# Patient Record
Sex: MALE | Race: WHITE | NOT HISPANIC OR LATINO | Employment: OTHER | URBAN - NONMETROPOLITAN AREA
[De-identification: names, ages, dates, MRNs, and addresses within clinical notes are randomized per-mention and may not be internally consistent; named-entity substitution may affect disease eponyms.]

---

## 2022-08-02 ENCOUNTER — TRANSFERRED RECORDS (OUTPATIENT)
Dept: HEALTH INFORMATION MANAGEMENT | Facility: OTHER | Age: 72
End: 2022-08-02

## 2023-04-14 ENCOUNTER — TELEPHONE (OUTPATIENT)
Dept: SURGERY | Facility: OTHER | Age: 73
End: 2023-04-14

## 2023-04-14 NOTE — TELEPHONE ENCOUNTER
[facility] Referral    [facility] has [facility] for [facility] with a diagnosis of GERD without esophagitis.    Please advise.    Thank you,  Ivania Méndez on 4/14/2023 at 9:42 AM

## 2023-04-18 NOTE — TELEPHONE ENCOUNTER
History needs to be reviewed before scheduling endoscopy-referral should be scanned it for us to review. Need to know medications, medical problems, surgical history, etc before considering a surgical procedure. Thanks! Alberta Abdullahi MD on 4/18/2023 at 11:51 AM

## 2023-04-18 NOTE — TELEPHONE ENCOUNTER
Ok to schedule next routine available diagnostic EGD Thanks! Alberta Abdullahi MD on 4/18/2023 at 2:35 PM

## 2023-04-19 ENCOUNTER — TELEPHONE (OUTPATIENT)
Dept: SURGERY | Facility: OTHER | Age: 73
End: 2023-04-19

## 2023-04-19 NOTE — TELEPHONE ENCOUNTER
Screening Questions for the Scheduling of Screening Colonoscopies   (If Colonoscopy is diagnostic, Provider should review the chart before scheduling.)  Are you younger than 50 or older than 80?  NO  Do you take aspirin or fish oil?  ASPIRIN (if yes, tell patient to stop 1 week prior to Colonoscopy)  Do you take warfarin (Coumadin), clopidogrel (Plavix), apixaban (Eliquis), dabigatram (Pradaxa), rivaroxaban (Xarelto) or any blood thinner? NO  Do you use oxygen at home?  CPAP  Do you have kidney disease? NO  Are you on dialysis? NO  Have you had a stroke or heart attack in the last year? NO  Have you had a stent in your heart or any blood vessel in the last year? NO  Have you had a transplant of any organ? NO  Have you had a colonoscopy or upper endoscopy (EGD) before? NO  Date of scheduled EGD. 07/05/2023  Provider [facility]  Pharmacy [facility]

## 2023-06-28 RX ORDER — LISINOPRIL AND HYDROCHLOROTHIAZIDE 12.5; 2 MG/1; MG/1
2 TABLET ORAL DAILY
COMMUNITY

## 2023-06-28 RX ORDER — ASPIRIN 81 MG/1
81 TABLET ORAL DAILY
COMMUNITY

## 2023-06-28 RX ORDER — ATORVASTATIN CALCIUM 80 MG/1
80 TABLET, FILM COATED ORAL DAILY
COMMUNITY

## 2023-06-28 RX ORDER — AMLODIPINE BESYLATE 10 MG/1
10 TABLET ORAL DAILY
COMMUNITY

## 2023-07-05 ENCOUNTER — ANESTHESIA (OUTPATIENT)
Dept: SURGERY | Facility: OTHER | Age: 73
End: 2023-07-05
Payer: COMMERCIAL

## 2023-07-05 ENCOUNTER — ANESTHESIA EVENT (OUTPATIENT)
Dept: SURGERY | Facility: OTHER | Age: 73
End: 2023-07-05
Payer: COMMERCIAL

## 2023-07-05 ENCOUNTER — HOSPITAL ENCOUNTER (OUTPATIENT)
Facility: OTHER | Age: 73
Discharge: HOME | End: 2023-07-05
Attending: SURGERY | Admitting: SURGERY
Payer: COMMERCIAL

## 2023-07-05 VITALS
TEMPERATURE: 97.5 F | HEART RATE: 52 BPM | OXYGEN SATURATION: 93 % | BODY MASS INDEX: 28.2 KG/M2 | WEIGHT: 197 LBS | SYSTOLIC BLOOD PRESSURE: 146 MMHG | HEIGHT: 70 IN | RESPIRATION RATE: 14 BRPM | DIASTOLIC BLOOD PRESSURE: 76 MMHG

## 2023-07-05 DIAGNOSIS — K22.70 BARRETT'S ESOPHAGUS DETERMINED BY ENDOSCOPY: Primary | ICD-10-CM

## 2023-07-05 LAB — GLUCOSE BLDC GLUCOMTR-MCNC: 127 MG/DL (ref 70–99)

## 2023-07-05 PROCEDURE — 250N000009 HC RX 250: Performed by: NURSE ANESTHETIST, CERTIFIED REGISTERED

## 2023-07-05 PROCEDURE — 43239 EGD BIOPSY SINGLE/MULTIPLE: CPT | Performed by: NURSE ANESTHETIST, CERTIFIED REGISTERED

## 2023-07-05 PROCEDURE — 82962 GLUCOSE BLOOD TEST: CPT

## 2023-07-05 PROCEDURE — 99100 ANES PT EXTEME AGE<1 YR&>70: CPT | Performed by: NURSE ANESTHETIST, CERTIFIED REGISTERED

## 2023-07-05 PROCEDURE — 43239 EGD BIOPSY SINGLE/MULTIPLE: CPT | Performed by: SURGERY

## 2023-07-05 PROCEDURE — 88305 TISSUE EXAM BY PATHOLOGIST: CPT

## 2023-07-05 PROCEDURE — 250N000011 HC RX IP 250 OP 636: Performed by: NURSE ANESTHETIST, CERTIFIED REGISTERED

## 2023-07-05 PROCEDURE — 258N000003 HC RX IP 258 OP 636: Performed by: SURGERY

## 2023-07-05 PROCEDURE — 999N000010 HC STATISTIC ANES STAT CODE-CRNA PER MINUTE: Performed by: SURGERY

## 2023-07-05 RX ORDER — NALOXONE HYDROCHLORIDE 0.4 MG/ML
0.2 INJECTION, SOLUTION INTRAMUSCULAR; INTRAVENOUS; SUBCUTANEOUS
Status: DISCONTINUED | OUTPATIENT
Start: 2023-07-05 | End: 2023-07-05 | Stop reason: HOSPADM

## 2023-07-05 RX ORDER — ONDANSETRON 4 MG/1
4 TABLET, ORALLY DISINTEGRATING ORAL EVERY 6 HOURS PRN
Status: DISCONTINUED | OUTPATIENT
Start: 2023-07-05 | End: 2023-07-05 | Stop reason: HOSPADM

## 2023-07-05 RX ORDER — FLUMAZENIL 0.1 MG/ML
0.2 INJECTION, SOLUTION INTRAVENOUS
Status: DISCONTINUED | OUTPATIENT
Start: 2023-07-05 | End: 2023-07-05 | Stop reason: HOSPADM

## 2023-07-05 RX ORDER — ONDANSETRON 2 MG/ML
4 INJECTION INTRAMUSCULAR; INTRAVENOUS EVERY 6 HOURS PRN
Status: DISCONTINUED | OUTPATIENT
Start: 2023-07-05 | End: 2023-07-05 | Stop reason: HOSPADM

## 2023-07-05 RX ORDER — PROPOFOL 10 MG/ML
INJECTION, EMULSION INTRAVENOUS CONTINUOUS PRN
Status: DISCONTINUED | OUTPATIENT
Start: 2023-07-05 | End: 2023-07-05

## 2023-07-05 RX ORDER — NALOXONE HYDROCHLORIDE 0.4 MG/ML
0.4 INJECTION, SOLUTION INTRAMUSCULAR; INTRAVENOUS; SUBCUTANEOUS
Status: DISCONTINUED | OUTPATIENT
Start: 2023-07-05 | End: 2023-07-05 | Stop reason: HOSPADM

## 2023-07-05 RX ORDER — PROPOFOL 10 MG/ML
INJECTION, EMULSION INTRAVENOUS PRN
Status: DISCONTINUED | OUTPATIENT
Start: 2023-07-05 | End: 2023-07-05

## 2023-07-05 RX ORDER — FAMOTIDINE 20 MG/1
20 TABLET, FILM COATED ORAL 2 TIMES DAILY
Qty: 60 TABLET | Refills: 3 | Status: SHIPPED | OUTPATIENT
Start: 2023-07-05

## 2023-07-05 RX ORDER — ONDANSETRON 2 MG/ML
4 INJECTION INTRAMUSCULAR; INTRAVENOUS
Status: DISCONTINUED | OUTPATIENT
Start: 2023-07-05 | End: 2023-07-05 | Stop reason: HOSPADM

## 2023-07-05 RX ORDER — PROCHLORPERAZINE MALEATE 5 MG
5 TABLET ORAL EVERY 6 HOURS PRN
Status: DISCONTINUED | OUTPATIENT
Start: 2023-07-05 | End: 2023-07-05 | Stop reason: HOSPADM

## 2023-07-05 RX ORDER — LIDOCAINE 40 MG/G
CREAM TOPICAL
Status: DISCONTINUED | OUTPATIENT
Start: 2023-07-05 | End: 2023-07-05 | Stop reason: HOSPADM

## 2023-07-05 RX ORDER — SODIUM CHLORIDE, SODIUM LACTATE, POTASSIUM CHLORIDE, CALCIUM CHLORIDE 600; 310; 30; 20 MG/100ML; MG/100ML; MG/100ML; MG/100ML
INJECTION, SOLUTION INTRAVENOUS CONTINUOUS
Status: DISCONTINUED | OUTPATIENT
Start: 2023-07-05 | End: 2023-07-05 | Stop reason: HOSPADM

## 2023-07-05 RX ORDER — LIDOCAINE HYDROCHLORIDE 20 MG/ML
INJECTION, SOLUTION INFILTRATION; PERINEURAL PRN
Status: DISCONTINUED | OUTPATIENT
Start: 2023-07-05 | End: 2023-07-05

## 2023-07-05 RX ADMIN — PROPOFOL 40 MG: 10 INJECTION, EMULSION INTRAVENOUS at 08:41

## 2023-07-05 RX ADMIN — PROPOFOL 140 MCG/KG/MIN: 10 INJECTION, EMULSION INTRAVENOUS at 08:41

## 2023-07-05 RX ADMIN — SODIUM CHLORIDE, POTASSIUM CHLORIDE, SODIUM LACTATE AND CALCIUM CHLORIDE: 600; 310; 30; 20 INJECTION, SOLUTION INTRAVENOUS at 08:10

## 2023-07-05 RX ADMIN — LIDOCAINE HYDROCHLORIDE 40 MG: 20 INJECTION, SOLUTION INFILTRATION; PERINEURAL at 08:41

## 2023-07-05 ASSESSMENT — ACTIVITIES OF DAILY LIVING (ADL)
ADLS_ACUITY_SCORE: 35
ADLS_ACUITY_SCORE: 35

## 2023-07-05 NOTE — H&P
"CHIEF COMPLAINT / REASON FOR PROCEDURE:  Reflux, epigastric pain    PERTINENT HISTORY   Patient complains of reflux and about a month of epigastric pain. Previous EGD none.     Past Medical History:   Diagnosis Date     Aortic valve sclerosis      Bilateral sensorineural hearing loss      Diabetes mellitus without complication (H)      Essential hypertension      KE (obstructive sleep apnea)      Presbyopia      Past Surgical History:   Procedure Laterality Date     RELEASE DUPUYTRENS CONTRACTURE BILATERAL       Other:  None  Bleeding tendencies:  no  Relevant Family History:  none    Relevant Social History:  none    A relevant review of systems was performed and wasNegative. See HPI.    ALLERGIES/SENSITIVITIES: No Known Allergies     CURRENT MEDICATIONS:    Current Facility-Administered Medications   Medication     lactated ringers infusion     lidocaine (LMX4) cream     lidocaine 1 % 0.1-1 mL     ondansetron (ZOFRAN) injection 4 mg     sodium chloride (PF) 0.9% PF flush 3 mL     sodium chloride (PF) 0.9% PF flush 3 mL     No current facility-administered medications on file prior to encounter.  amLODIPine (NORVASC) 10 MG tablet, Take 10 mg by mouth daily  aspirin 81 MG EC tablet, Take 81 mg by mouth daily  atorvastatin (LIPITOR) 80 MG tablet, Take 80 mg by mouth daily  lisinopril-hydrochlorothiazide (ZESTORETIC) 20-12.5 MG tablet, Take 2 tablets by mouth daily  metFORMIN (GLUCOPHAGE) 500 MG tablet, Take 1,000 mg by mouth 2 times daily (with meals)          PRE-SEDATION ASSESSMENT:   /61   Pulse 55   Temp 97.3  F (36.3  C) (Temporal)   Resp 14   Ht 1.778 m (5' 10\")   Wt 89.4 kg (197 lb)   BMI 28.27 kg/m    Lung Exam:Normal  Heart Exam:  Normal    Comment(s):      IMPRESSION:  Epigastric pain.    PLAN:  I discussed diagnostic EGD with the patient. Anesthesia requested due to: age more than 68, history of KE.      "

## 2023-07-05 NOTE — OP NOTE
PROCEDURE NOTE    SURGEON: Alberta Abdullahi MD.    PRE-OP DIAGNOSIS:   Epigastric pain      POST-OP DIAGNOSIS: Rubalcava's changes, gastritis    PROCEDURE PLANNED:   Diagnostic EGD, flexible        PROCEDURE PERFORMED:  EGD with biopsy, flexible    SPECIMEN:  Duodenum, antrum, GEJ biopsies    ANESTHESIA: See anesthesia record, Coverage requested due to: KE and age more than 68    ESTIMATED BLOOD LOSS: None    COMPLICATIONS:  None    INDICATION FORTHE PROCEDURE: The patient is a 72 year oldmale. The patient presents with epigastric pain. I explained to the patient the risks, benefits and alternatives to diagnostic EGD for evaluating his complaints. We specifically discussed the risks of bleeding, infection, perforation and the risks of sedation. The patient's questions were answered and the patient wished to proceed. Informed consent paperwork was completed.    PROCEDURE: The patient was taken to the endoscopy suite. Appropriate monitors were attached. The patient was placed in the left lateral decubitus position. Bite block was positioned.Timeout was performed confirming the patient's identity and procedure to be performed. After appropriate sedation was confirmed, the flexible endoscope was advanced into the oropharynx. The posterior oropharynx appeared grossly normal. The scope was advanced into the proximal esophagus. The esophagus was insufflated with air. The scope was advanced under direct visualization. No acute abnormalities of the esophagus were noted. The scope was advanced into the stomach. Gastritis was noted. The scope was advanced through the pylorus into the duodenal bulb. The bulb and distal duodenum appeared mildly inflamed. Biopsies were obtained. The scope was withdrawn back into the stomach. Antral biopsy was obtained and sent to pathology. The scope was retroflexed and the GE junction inspected. No abnormalities were noted. The scope was returned to a neutral position and the stomach was  decompressed. The scope was withdrawn to the GE junction and biopsy obtained. There was a long segment of Rubalcava's changes. The mucosa of the esophagus was inspected while withdrawing the scope. No abnormalities were noted. The scope was withdrawn from the patient. The bite block was removed. The patient tolerated the procedure with no immediately apparent complication. The patient was taken to recovery in stable condition.    FOLLOW UP:  RECOMMENDATIONS Will call with pathology results. Start pepcid.

## 2023-07-05 NOTE — ANESTHESIA POSTPROCEDURE EVALUATION
Patient: Tahir James    Procedure: Procedure(s):  Esophagoscopy, gastroscopy, duodenoscopy (EGD), combined       Anesthesia Type:  MAC    Note:  Disposition: Outpatient   Postop Pain Control: Uneventful            Sign Out: Well controlled pain   PONV: No   Neuro/Psych: Uneventful            Sign Out: Acceptable/Baseline neuro status   Airway/Respiratory: Uneventful            Sign Out: Acceptable/Baseline resp. status   CV/Hemodynamics: Uneventful            Sign Out: Acceptable CV status; No obvious hypovolemia; No obvious fluid overload   Other NRE: NONE   DID A NON-ROUTINE EVENT OCCUR?            Last vitals:  Vitals Value Taken Time   /51 07/05/23 0900   Temp 97.6  F (36.4  C) 07/05/23 0855   Pulse 52 07/05/23 0900   Resp     SpO2 93 % 07/05/23 0909   Vitals shown include unvalidated device data.    Electronically Signed By: JAKI COLALDO CRNA  July 5, 2023  9:10 AM

## 2023-07-05 NOTE — DISCHARGE INSTRUCTIONS
[facility]  Adult Discharge Orders & Instructions    ________________________________________________________________          For 12 hours after surgery  Get plenty of rest.  A responsible adult must stay with you for at least 12 hours after you leave the hospital.   You may feel lightheaded.  IF so, sit for a few minutes before standing.  Have someone help you get up.   You may have a slight fever. Call the doctor if your fever is over 101 F (38.3 C) (taken under the tongue) or lasts longer than 24 hours.  You may have a dry mouth, a sore throat, muscle aches or trouble sleeping.  These should go away after 24 hours.  Do not make important or legal decisions.  6.   Do not drive or use heavy equipment.  If you have weakness or tingling, don't drive or use heavy equipment until this feeling goes away.    To contact a doctor, call   [phone]_______________________   Procedure you had done: EGD with biopsies  Your health care provider is:  No Ref-Primary, Physician  Your surgeon is Dr. Alberta Abdullahi.   Please call your health care provider or surgeon at [phone] if:    - you feel you are getting worse or having an increase in problems    - fever greater than 101 degrees  - increasing shortness of breath or chest pain  - any signs of infection (increasing redness, swelling, tenderness, warmth, change in appearance, or  increased drainage)  - blood in your urine or stool  - coughing or vomiting blood  - nausea (upset stomach) and vomiting and/or diarrhea that will not stop  - severe pain that is not relieved by medicine, rest or ice  You have had medications for sedation. Please be aware that this can cause drowsiness and impaired judgment for up to 24 hours after your procedure. Do not drive, operate power tools or drink alcohol for 24 hours.  If samples were taken-you will get a phone call and a letter with your results in the next 7-10 days. If you don't get results, please call and let us know!

## 2023-07-05 NOTE — ANESTHESIA PREPROCEDURE EVALUATION
Anesthesia Pre-Procedure Evaluation    Patient: Tahir James   MRN: [id number] : 1950        Procedure : Procedure(s):  Esophagoscopy, gastroscopy, duodenoscopy (EGD), combined          Past Medical History:   Diagnosis Date     Aortic valve sclerosis      Bilateral sensorineural hearing loss      Diabetes mellitus without complication (H)      Essential hypertension      KE (obstructive sleep apnea)      Presbyopia       Past Surgical History:   Procedure Laterality Date     RELEASE DUPUYTRENS CONTRACTURE BILATERAL        No Known Allergies   Social History     Tobacco Use     Smoking status: Not on file     Smokeless tobacco: Not on file   Substance Use Topics     Alcohol use: Not on file      Wt Readings from Last 1 Encounters:   23 89.4 kg (197 lb)        Anesthesia Evaluation   Pt has had prior anesthetic. Type: MAC.    No history of anesthetic complications       ROS/MED HX  ENT/Pulmonary:     (+) sleep apnea, mild, uses CPAP,     Neurologic:  - neg neurologic ROS     Cardiovascular:     (+) hypertension-----    METS/Exercise Tolerance: >4 METS    Hematologic:  - neg hematologic  ROS     Musculoskeletal:  - neg musculoskeletal ROS     GI/Hepatic:  - neg GI/hepatic ROS     Renal/Genitourinary:  - neg Renal ROS     Endo:     (+) type II DM, Not using insulin, - not using insulin pump. not previously admitted for DM/DKA.     Psychiatric/Substance Use:  - neg psychiatric ROS     Infectious Disease:  - neg infectious disease ROS     Malignancy:  - neg malignancy ROS     Other:  - neg other ROS   (-) Any chance pregnant       Physical Exam    Airway        Mallampati: II   TM distance: > 3 FB   Neck ROM: full   Mouth opening: > 3 cm    Respiratory Devices and Support         Dental     Comment: Top and bottom denture plate.     (+) Edentulous and Removable bridges or other hardware      Cardiovascular   cardiovascular exam normal       Rhythm and rate: regular and normal     Pulmonary    pulmonary exam normal        breath sounds clear to auscultation           OUTSIDE LABS:  CBC: No results found for: WBC, HGB, HCT, PLT  BMP:   Lab Results   Component Value Date     (H) 07/05/2023     COAGS: No results found for: PTT, INR, FIBR  POC: No results found for: BGM, HCG, HCGS  HEPATIC: No results found for: ALBUMIN, PROTTOTAL, ALT, AST, GGT, ALKPHOS, BILITOTAL, BILIDIRECT, XENIA  OTHER: No results found for: PH, LACT, A1C, WILLIAMS, PHOS, MAG, LIPASE, AMYLASE, TSH, T4, T3, CRP, SED    Anesthesia Plan    ASA Status:  2   NPO Status:  NPO Appropriate    Anesthesia Type: MAC.     - Reason for MAC: straight local not clinically adequate   Induction: Propofol.   Maintenance: TIVA.        Consents    Anesthesia Plan(s) and associated risks, benefits, and realistic alternatives discussed. Questions answered and patient/representative(s) expressed understanding.    - Discussed:     - Discussed with:  Patient      - Extended Intubation/Ventilatory Support Discussed: No.      - Patient is DNR/DNI Status: No    Use of blood products discussed: No .     Postoperative Care            Comments:                JAKI Collado CRNA

## 2023-07-05 NOTE — OR NURSING
Patient has been discharged to home at 1015 via ambulation to car accompanied by Ursula.    Written discharge instructions were provided to patient.  Prescriptions were escribed.  Patient states their pain is 0/10, and denies any nausea or dizziness upon discharge.    Patient and adult caring for them verbalize understanding of discharge instructions including no driving until tomorrow and no longer taking narcotic pain medications - no operating mechanical equipment and no making any important decisions.They understand reason for discharge, and necessary follow-up appointments.

## 2023-07-05 NOTE — ANESTHESIA CARE TRANSFER NOTE
Patient: Tahir James    Procedure: Procedure(s):  Esophagoscopy, gastroscopy, duodenoscopy (EGD), combined       Diagnosis: GERD (gastroesophageal reflux disease) [K21.9]  Diagnosis Additional Information: No value filed.    Anesthesia Type:   MAC     Note:    Oropharynx: oropharynx clear of all foreign objects  Level of Consciousness: awake  Oxygen Supplementation: room air        Vital Signs Stable: post-procedure vital signs reviewed and stable  Report to RN Given: handoff report given  Patient transferred to: Phase II    Handoff Report: Identifed the Patient, Identified the Reponsible Provider, Reviewed the pertinent medical history, Discussed the surgical course, Reviewed Intra-OP anesthesia mangement and issues during anesthesia, Set expectations for post-procedure period and Allowed opportunity for questions and acknowledgement of understanding      Vitals:  Vitals Value Taken Time   BP     Temp     Pulse     Resp     SpO2         Electronically Signed By: JAKI COLLADO [facility]  July 5, 2023  8:55 AM

## 2023-07-07 LAB
PATH REPORT.COMMENTS IMP SPEC: NORMAL
PATH REPORT.FINAL DX SPEC: NORMAL
PATH REPORT.RELEVANT HX SPEC: NORMAL
PHOTO IMAGE: NORMAL

## 2023-07-11 DIAGNOSIS — A04.8 H. PYLORI INFECTION: Primary | ICD-10-CM

## 2023-07-11 RX ORDER — AMOXICILLIN 500 MG/1
1000 CAPSULE ORAL 2 TIMES DAILY
Qty: 56 CAPSULE | Refills: 0 | Status: SHIPPED | OUTPATIENT
Start: 2023-07-11 | End: 2023-07-25

## 2023-07-11 RX ORDER — CLARITHROMYCIN 500 MG
500 TABLET ORAL 2 TIMES DAILY
Qty: 28 TABLET | Refills: 0 | Status: SHIPPED | OUTPATIENT
Start: 2023-07-11 | End: 2023-07-25

## 2023-07-14 ENCOUNTER — TELEPHONE (OUTPATIENT)
Dept: SURGERY | Facility: OTHER | Age: 73
End: 2023-07-14

## 2023-07-14 NOTE — TELEPHONE ENCOUNTER
[facility] with the [facility] called and requested a call back regarding last office visit notes. Saima stated the notes are needed to order famotidine.    Fax ([facility]): [phone]  Fax: (alternate; [facility]): [phone]    Okay to leave detailed message.    Maame Leong on 7/14/2023 at 3:55 PM

## 2023-07-20 ENCOUNTER — TELEPHONE (OUTPATIENT)
Dept: SURGERY | Facility: OTHER | Age: 73
End: 2023-07-20

## 2023-07-20 NOTE — TELEPHONE ENCOUNTER
Called and spoke to Patient after verifying last name and date of birth. Patient was calling to verify if they need to take the amoxicillin, omeprazole and the clarithromycin for 14 days, per the order for the medications from Dr. Abdullahi, take for 14 days. Patient was wondering if famotidine is something they would have to take long term, discussed if medication is working to [facility] to have discuss refilling it. Patient verbalized understanding and would call back if any other questions come up.       Tonya Ahumada RN on 7/20/2023 at 2:34 PM

## 2023-08-01 NOTE — TELEPHONE ENCOUNTER
I have not received a return call from the [facility] or the patient.  Will open new note if they call back.  Ronda Phan LPN .......8/1/2023 4:56 PM

## 2023-08-07 ENCOUNTER — MEDICAL CORRESPONDENCE (OUTPATIENT)
Dept: HEALTH INFORMATION MANAGEMENT | Facility: OTHER | Age: 73
End: 2023-08-07

## 2023-08-30 DIAGNOSIS — Z12.11 SPECIAL SCREENING FOR MALIGNANT NEOPLASMS, COLON: Primary | ICD-10-CM

## 2023-08-30 RX ORDER — BISACODYL 5 MG
TABLET, DELAYED RELEASE (ENTERIC COATED) ORAL
Qty: 2 TABLET | Refills: 0 | Status: ON HOLD | OUTPATIENT
Start: 2023-08-30 | End: 2023-12-01

## 2023-08-30 RX ORDER — POLYETHYLENE GLYCOL 3350, SODIUM CHLORIDE, SODIUM BICARBONATE, POTASSIUM CHLORIDE 420; 11.2; 5.72; 1.48 G/4L; G/4L; G/4L; G/4L
4000 POWDER, FOR SOLUTION ORAL ONCE
Qty: 4000 ML | Refills: 0 | Status: SHIPPED | OUTPATIENT
Start: 2023-08-30 | End: 2023-08-30

## 2023-08-30 NOTE — TELEPHONE ENCOUNTER
Screening Questions for the Scheduling of Screening Colonoscopies   (If Colonoscopy is diagnostic, Provider should review the chart before scheduling.)  Are you younger than 50 or older than 80?  NO  Do you take aspirin or fish oil?  ASPIRIN (if yes, tell patient to stop 1 week prior to Colonoscopy)  Do you take warfarin (Coumadin), clopidogrel (Plavix), apixaban (Eliquis), dabigatram (Pradaxa), rivaroxaban (Xarelto) or any blood thinner? NO  Do you use oxygen at home?  CPAP  Do you have kidney disease? NO  Are you on dialysis? NO  Have you had a stroke or heart attack in the last year? NO  Have you had a stent in your heart or any blood vessel in the last year? NO  Have you had a transplant of any organ? NO  Have you had a colonoscopy or upper endoscopy (EGD) before? YES         When?  2018  Date of scheduled Colonoscopy. 12/01/2023  Provider [facility]

## 2023-09-08 ENCOUNTER — OFFICE VISIT (OUTPATIENT)
Dept: FAMILY MEDICINE | Facility: OTHER | Age: 73
End: 2023-09-08
Attending: NURSE PRACTITIONER
Payer: COMMERCIAL

## 2023-09-08 VITALS
SYSTOLIC BLOOD PRESSURE: 138 MMHG | RESPIRATION RATE: 16 BRPM | BODY MASS INDEX: 32.01 KG/M2 | HEART RATE: 66 BPM | HEIGHT: 68 IN | OXYGEN SATURATION: 99 % | DIASTOLIC BLOOD PRESSURE: 50 MMHG | TEMPERATURE: 97.9 F | WEIGHT: 211.2 LBS

## 2023-09-08 DIAGNOSIS — B30.9 VIRAL CONJUNCTIVITIS: Primary | ICD-10-CM

## 2023-09-08 PROCEDURE — 99203 OFFICE O/P NEW LOW 30 MIN: CPT | Performed by: FAMILY MEDICINE

## 2023-09-08 PROCEDURE — G0463 HOSPITAL OUTPT CLINIC VISIT: HCPCS

## 2023-09-08 ASSESSMENT — PAIN SCALES - GENERAL: PAINLEVEL: NO PAIN (0)

## 2023-09-08 NOTE — PROGRESS NOTES
"Nursing Notes:   Gosselin, Norma J., LPN  9/8/2023 12:26 PM  Sign at exiting of workspace  Chief Complaint   Patient presents with    Foreign Body in Eye     rIGHT- irritation started yesterday- unknown why         Initial /50   Pulse 66   Temp 97.9  F (36.6  C) (Tympanic)   Resp 16   Ht 1.727 m (5' 8\")   Wt 95.8 kg (211 lb 3.2 oz)   SpO2 99%   BMI 32.11 kg/m   Estimated body mass index is 32.11 kg/m  as calculated from the following:    Height as of this encounter: 1.727 m (5' 8\").    Weight as of this encounter: 95.8 kg (211 lb 3.2 oz).         Norma J. Gosselin, LPN           BMI:   Estimated body mass index is 32.11 kg/m  as calculated from the following:    Height as of this encounter: 1.727 m (5' 8\").    Weight as of this encounter: 95.8 kg (211 lb 3.2 oz).               Subjective   Marcello is a 72 year old, presenting for the following health issues:  Foreign Body in Eye (rIGHT- irritation started yesterday- unknown why)      HPI     Yesterday started with right eye irritation.  Sore throat started earlier this week.  No fever.  Has had some rhinorrhea and starting to cough.  No history of activities that would have caused a foreign body.  His eye was mattered shut this morning. Did a covid test at home this morning which was negative.            Review of Systems   Constitutional, HEENT, cardiovascular, pulmonary, GI, , musculoskeletal, neuro, skin, endocrine and psych systems are negative, except as otherwise noted.      Objective    /50   Pulse 66   Temp 97.9  F (36.6  C) (Tympanic)   Resp 16   Ht 1.727 m (5' 8\")   Wt 95.8 kg (211 lb 3.2 oz)   SpO2 99%   BMI 32.11 kg/m    Body mass index is 32.11 kg/m .  Physical Exam  Constitutional:       Appearance: Normal appearance.   HENT:      Head: Normocephalic.      Right Ear: Tympanic membrane normal.      Left Ear: Tympanic membrane normal.      Nose: Congestion and rhinorrhea present.      Mouth/Throat:      Mouth: Mucous membranes are " moist.      Pharynx: Oropharynx is clear. No oropharyngeal exudate or posterior oropharyngeal erythema.   Eyes:      General:         Right eye: Discharge present.         Left eye: No discharge.      Pupils: Pupils are equal, round, and reactive to light.      Comments: Right eye with conjunctival injection.   Cardiovascular:      Rate and Rhythm: Normal rate and regular rhythm.      Heart sounds: Normal heart sounds. No murmur heard.  Pulmonary:      Effort: Pulmonary effort is normal.      Breath sounds: Normal breath sounds. No wheezing, rhonchi or rales.   Musculoskeletal:      Cervical back: Normal range of motion and neck supple.   Lymphadenopathy:      Cervical: No cervical adenopathy.   Neurological:      Mental Status: He is alert.   Psychiatric:         Mood and Affect: Mood normal.         Behavior: Behavior normal.            Assessment & Plan     ICD-10-CM    1. Viral conjunctivitis  B30.9           1.  Discussed that symptoms are likely related to underlying viral infection at this point.  Discussed that antibiotics will not necessarily speed the improvement of his eye symptoms, but he could have an eyedrop if desired.  He declines for the time being.  Recommended symptomatic cares.  Follow-up if symptoms are getting significantly worse.      No follow-ups on file.    Marija Tomas MD  [facility]

## 2023-09-08 NOTE — NURSING NOTE
"Chief Complaint   Patient presents with    Foreign Body in Eye     rIGHT- irritation started yesterday- unknown why         Initial /50   Pulse 66   Temp 97.9  F (36.6  C) (Tympanic)   Resp 16   Ht 1.727 m (5' 8\")   Wt 95.8 kg (211 lb 3.2 oz)   SpO2 99%   BMI 32.11 kg/m   Estimated body mass index is 32.11 kg/m  as calculated from the following:    Height as of this encounter: 1.727 m (5' 8\").    Weight as of this encounter: 95.8 kg (211 lb 3.2 oz).         Norma J. Gosselin, LPN     "

## 2023-12-01 ENCOUNTER — ANESTHESIA (OUTPATIENT)
Dept: SURGERY | Facility: OTHER | Age: 73
End: 2023-12-01
Payer: COMMERCIAL

## 2023-12-01 ENCOUNTER — ANESTHESIA EVENT (OUTPATIENT)
Dept: SURGERY | Facility: OTHER | Age: 73
End: 2023-12-01
Payer: COMMERCIAL

## 2023-12-01 ENCOUNTER — HOSPITAL ENCOUNTER (OUTPATIENT)
Facility: OTHER | Age: 73
Discharge: HOME | End: 2023-12-01
Attending: SURGERY | Admitting: SURGERY
Payer: COMMERCIAL

## 2023-12-01 VITALS
DIASTOLIC BLOOD PRESSURE: 81 MMHG | HEART RATE: 65 BPM | WEIGHT: 204 LBS | SYSTOLIC BLOOD PRESSURE: 176 MMHG | OXYGEN SATURATION: 98 % | TEMPERATURE: 98.8 F | BODY MASS INDEX: 31.02 KG/M2 | RESPIRATION RATE: 12 BRPM

## 2023-12-01 DIAGNOSIS — K63.5 POLYP OF TRANSVERSE COLON, UNSPECIFIED TYPE: Primary | ICD-10-CM

## 2023-12-01 LAB — GLUCOSE BLDC GLUCOMTR-MCNC: 127 MG/DL (ref 70–99)

## 2023-12-01 PROCEDURE — 45385 COLONOSCOPY W/LESION REMOVAL: CPT

## 2023-12-01 PROCEDURE — 258N000003 HC RX IP 258 OP 636: Performed by: SURGERY

## 2023-12-01 PROCEDURE — 45385 COLONOSCOPY W/LESION REMOVAL: CPT | Mod: PT | Performed by: SURGERY

## 2023-12-01 PROCEDURE — 250N000009 HC RX 250

## 2023-12-01 PROCEDURE — 99100 ANES PT EXTEME AGE<1 YR&>70: CPT

## 2023-12-01 PROCEDURE — 82962 GLUCOSE BLOOD TEST: CPT

## 2023-12-01 PROCEDURE — 88305 TISSUE EXAM BY PATHOLOGIST: CPT

## 2023-12-01 PROCEDURE — 45380 COLONOSCOPY AND BIOPSY: CPT | Performed by: SURGERY

## 2023-12-01 PROCEDURE — 250N000011 HC RX IP 250 OP 636

## 2023-12-01 RX ORDER — LIDOCAINE 40 MG/G
CREAM TOPICAL
Status: DISCONTINUED | OUTPATIENT
Start: 2023-12-01 | End: 2023-12-01 | Stop reason: HOSPADM

## 2023-12-01 RX ORDER — ONDANSETRON 2 MG/ML
4 INJECTION INTRAMUSCULAR; INTRAVENOUS
Status: DISCONTINUED | OUTPATIENT
Start: 2023-12-01 | End: 2023-12-01 | Stop reason: HOSPADM

## 2023-12-01 RX ORDER — ONDANSETRON 4 MG/1
4 TABLET, ORALLY DISINTEGRATING ORAL EVERY 6 HOURS PRN
Status: DISCONTINUED | OUTPATIENT
Start: 2023-12-01 | End: 2023-12-01 | Stop reason: HOSPADM

## 2023-12-01 RX ORDER — ONDANSETRON 2 MG/ML
4 INJECTION INTRAMUSCULAR; INTRAVENOUS EVERY 6 HOURS PRN
Status: DISCONTINUED | OUTPATIENT
Start: 2023-12-01 | End: 2023-12-01 | Stop reason: HOSPADM

## 2023-12-01 RX ORDER — NALOXONE HYDROCHLORIDE 0.4 MG/ML
0.4 INJECTION, SOLUTION INTRAMUSCULAR; INTRAVENOUS; SUBCUTANEOUS
Status: DISCONTINUED | OUTPATIENT
Start: 2023-12-01 | End: 2023-12-01 | Stop reason: HOSPADM

## 2023-12-01 RX ORDER — PROPOFOL 10 MG/ML
INJECTION, EMULSION INTRAVENOUS CONTINUOUS PRN
Status: DISCONTINUED | OUTPATIENT
Start: 2023-12-01 | End: 2023-12-01

## 2023-12-01 RX ORDER — PROPOFOL 10 MG/ML
INJECTION, EMULSION INTRAVENOUS PRN
Status: DISCONTINUED | OUTPATIENT
Start: 2023-12-01 | End: 2023-12-01

## 2023-12-01 RX ORDER — SODIUM CHLORIDE, SODIUM LACTATE, POTASSIUM CHLORIDE, CALCIUM CHLORIDE 600; 310; 30; 20 MG/100ML; MG/100ML; MG/100ML; MG/100ML
INJECTION, SOLUTION INTRAVENOUS CONTINUOUS
Status: DISCONTINUED | OUTPATIENT
Start: 2023-12-01 | End: 2023-12-01 | Stop reason: HOSPADM

## 2023-12-01 RX ORDER — PROCHLORPERAZINE MALEATE 5 MG
5 TABLET ORAL EVERY 6 HOURS PRN
Status: DISCONTINUED | OUTPATIENT
Start: 2023-12-01 | End: 2023-12-01 | Stop reason: HOSPADM

## 2023-12-01 RX ORDER — LIDOCAINE HYDROCHLORIDE 20 MG/ML
INJECTION, SOLUTION INFILTRATION; PERINEURAL PRN
Status: DISCONTINUED | OUTPATIENT
Start: 2023-12-01 | End: 2023-12-01

## 2023-12-01 RX ORDER — NALOXONE HYDROCHLORIDE 0.4 MG/ML
0.2 INJECTION, SOLUTION INTRAMUSCULAR; INTRAVENOUS; SUBCUTANEOUS
Status: DISCONTINUED | OUTPATIENT
Start: 2023-12-01 | End: 2023-12-01 | Stop reason: HOSPADM

## 2023-12-01 RX ORDER — GLYCOPYRROLATE 0.2 MG/ML
INJECTION, SOLUTION INTRAMUSCULAR; INTRAVENOUS PRN
Status: DISCONTINUED | OUTPATIENT
Start: 2023-12-01 | End: 2023-12-01

## 2023-12-01 RX ORDER — FLUMAZENIL 0.1 MG/ML
0.2 INJECTION, SOLUTION INTRAVENOUS
Status: DISCONTINUED | OUTPATIENT
Start: 2023-12-01 | End: 2023-12-01 | Stop reason: HOSPADM

## 2023-12-01 RX ADMIN — PROPOFOL 50 MG: 10 INJECTION, EMULSION INTRAVENOUS at 11:00

## 2023-12-01 RX ADMIN — SODIUM CHLORIDE, POTASSIUM CHLORIDE, SODIUM LACTATE AND CALCIUM CHLORIDE: 600; 310; 30; 20 INJECTION, SOLUTION INTRAVENOUS at 10:20

## 2023-12-01 RX ADMIN — LIDOCAINE HYDROCHLORIDE 80 MG: 20 INJECTION, SOLUTION INFILTRATION; PERINEURAL at 11:00

## 2023-12-01 RX ADMIN — GLYCOPYRROLATE 0.2 MG: 0.2 INJECTION, SOLUTION INTRAMUSCULAR; INTRAVENOUS at 11:07

## 2023-12-01 RX ADMIN — PROPOFOL 150 MCG/KG/MIN: 10 INJECTION, EMULSION INTRAVENOUS at 11:00

## 2023-12-01 ASSESSMENT — ACTIVITIES OF DAILY LIVING (ADL)
ADLS_ACUITY_SCORE: 35
ADLS_ACUITY_SCORE: 35

## 2023-12-01 NOTE — OP NOTE
PROCEDURE NOTE    SURGEON: Alberta Epstein MD.    PRE-OP DIAGNOSIS:  Screening Colonoscopy      POST-OP DIAGNOSIS: colon polyps     Location: Transverse colon x 2 Size: 0.4 cm  Removed:  Y    PROCEDURE:  Colonoscopy with polypectomy cold snare    ESTIMATEDBLOOD LOSS: none    COMPLICATIONS:  None    SPECIMEN:  transverse colon polyps    ANESTHESIA:  See anesthesia note, anesthesia requested due to: age more than 70    INDICATION FOR THE PROCEDURE: The patient is a 73 year old male. The patient has no complaints. I explained to the patient the risks, benefits and alternatives to screening colonoscopy for evaluating the colon for colon polyps and colon cancer. We specifically discussed the risks of bleeding, infection, perforation, potential inability to reach the cecum and the risks of sedation. The patient's questions were answered and the patient wished to proceed. Informed consent paperwork was completed.    PROCEDURE: The patient was taken to the endoscopy suite. Appropriate monitors were attached. The patient was placed in the left lateral decubitus position.Timeout was performed confirming the patient's identity and procedure to be performed. After appropriate sedation was confirmed, digital rectal exam was performed. There was normal tone and no gross abnormality was noted. The lubricated colonoscope was introduced into the anus the colon was insufflated with air. The prep quality was adequate. Under direct visualization the scope was advanced to the cecum. The ileocecal valve was intubated and the terminal ileum inspected. No gross abnormality was noted. The scope was withdrawn back into the cecum. The mucosa of colon was inspected while withdrawing the scope. Two sessile polyps were noted in the transverse colon and removed with cold snare. The scope was retroflexed in the rectum and the anorectal junction was inspected. No abnormalities were noted. The scope was returned to a neutral position and the colon was  decompressed. The scope was removed. The patient tolerated the procedure with no immediately apparent complication. The patient was taken to recovery in stable condition.  FOLLOW UP:  RECOMMEND high fiber diet, follow up: will call with pathology results.

## 2023-12-01 NOTE — DISCHARGE INSTRUCTIONS
Procedure you had done: colonoscopy with removal of polyps  Your health care provider is:  No Ref-Primary, Physician  Your surgeon is Dr. Alberta Abdullahi.   Please call your health care provider or surgeon at [phone] if:    - you feel you are getting worse or having an increase in problems    - fever greater than 101 degrees  - increasing shortness of breath or chest pain  - any signs of infection (increasing redness, swelling, tenderness, warmth, change in appearance, or  increased drainage)  - blood in your urine or stool  - coughing or vomiting blood  - nausea (upset stomach) and vomiting and/or diarrhea that will not stop  - severe pain that is not relieved by medicine, rest or ice  You have had medications for sedation. Please be aware that this can cause drowsiness and impaired judgment for up to 24 hours after your procedure. Do not drive, operate power tools or drink alcohol for 24 hours.  If samples were taken-you will get a phone call and a letter with your results in the next 7-10 days. If you don't get results, please call and let us know!     [facility]  Adult Discharge Orders & Instructions    ________________________________________________________________          For 12 hours after surgery  Get plenty of rest.  A responsible adult must stay with you for at least 12 hours after you leave the hospital.   You may feel lightheaded.  IF so, sit for a few minutes before standing.  Have someone help you get up.   You may have a slight fever. Call the doctor if your fever is over 101 F (38.3 C) (taken under the tongue) or lasts longer than 24 hours.  You may have a dry mouth, a sore throat, muscle aches or trouble sleeping.  These should go away after 24 hours.  Do not make important or legal decisions.  6.   Do not drive or use heavy equipment.  If you have weakness or tingling, don't drive or use heavy equipment until this feeling goes away.    To contact a doctor, call    [phone]_______________________    Your doctor recommends that you eat 25 to 30 Grams of fiber daily. The following are some examples of fiber amounts in different foods.    Fruits: Apple (with skin) 1 medium = 4.4 Grams   Banana      1 medium = 3.1 Grams   Oranges     1 orange = 3.1 Grams   Prunes     1 cup, pitted = 12.4 Grams    Juices: Apple, unsweetened w/ added ascorbic acid  1 cup = 0.5 Grams    Grapefruit, white, canned,sweetened  1 cup = 0.2 Grams    Grape, unsweetened w/ added ascorbic acid  1 cup = 0.5 Grams    Orange     1 cup = 0.7 Grams    Vegetables:   Cooked: Green Beans   1 cup = 4.0 Grams       Carrots   1/2 cup sliced = 2.3 Grams       Peas       1 cup = 8.8 Grams       Potato (baked, with skin)  1 medium = 3.8 Grams    Raw: Cucumber (with peel)  1 cucumber = 1.5 Grams            Lettuce     1 cup shredded = 0.5 Grams            Tomato   1 medium tomato = 1.5 Grams            Spinach  1 cup = 0.7 Grams    Legumes: Baked beans, canned, no salt added  1 cup = 13.9 Grams         Kidney Beans, canned  1 cup = 13.6 Grams         [place] Beans, canned     1 cup = 11.6 Grams         Lentils, boiled   1 cup = 15.6 Grams    Breads, Pastas, Flours: Bran muffins   1 medium muffin = 5.2 Grams           Oatmeal, cooked  1 cup = 4.0 Grams           White Bread   1 slice = 0.6 Grams           Whole- wheat bread = 1.9 Grams    Pasta and rice, cooked: Macaroni  1 cup = 2.5 Grams           Rice, Brown  1 cup = 3.5 Grams           Rice, white   1 cup = 0.6 Grams           Spaghetti (regular) 1 cup = 2.5 Grams    Nuts: Almonds   1 cup = 17.4 Grams            Peanuts    1 cup = 12.4 Grams

## 2023-12-01 NOTE — ANESTHESIA CARE TRANSFER NOTE
Patient: Tahir James    Procedure: Procedure(s):  COLONOSCOPY, WITH POLYPECTOMY AND BIOPSY       Diagnosis: Family history of colonic polyps [Z83.71]  Diagnosis Additional Information: No value filed.    Anesthesia Type:   MAC     Note:    Oropharynx: spontaneously breathing and oropharynx clear of all foreign objects  Level of Consciousness: drowsy  Oxygen Supplementation: room air    Independent Airway: airway patency satisfactory and stable  Dentition: dentition unchanged  Vital Signs Stable: post-procedure vital signs reviewed and stable  Report to RN Given: handoff report given  Patient transferred to: Phase II    Handoff Report: Identifed the Patient, Identified the Reponsible Provider, Reviewed the pertinent medical history, Discussed the surgical course, Reviewed Intra-OP anesthesia mangement and issues during anesthesia, Set expectations for post-procedure period and Allowed opportunity for questions and acknowledgement of understanding      Vitals:  Vitals Value Taken Time   BP     Temp     Pulse     Resp     SpO2 95 % 12/01/23 1130   Vitals shown include unfiled device data.    Electronically Signed By: JAKI Collado CRNA  December 1, 2023  11:30 AM

## 2023-12-01 NOTE — H&P
PRE-PROCEDURE NOTE    CHIEF COMPLAINT / REASON FORPROCEDURE:  Need for screening colonoscopy.    PERTINENT HISTORY   Patient with no complaints. Previous colonoscopy 5 years ago-5 year follow up recommended. No diarrhea, constipation, abdominal pain or rectal bleeding. Family history of colon polyps.  Past Medical History:   Diagnosis Date    Aortic valve sclerosis     Bilateral sensorineural hearing loss     Diabetes mellitus without complication (H)     Essential hypertension     KE (obstructive sleep apnea)     Presbyopia      Past Surgical History:   Procedure Laterality Date    ESOPHAGOSCOPY, GASTROSCOPY, DUODENOSCOPY (EGD), COMBINED N/A 07/05/2023    h pylori    RELEASE DUPUYTRENS CONTRACTURE BILATERAL       Other:  None  Bleeding tendencies:  No    Relevant Family History:  yes, colon polyps    Relevant Social History:  none    A relevant review of systems was performed and was Negative.    ALLERGIES/SENSITIVITIES: No Known Allergies     CURRENTMEDICATIONS:    No current facility-administered medications on file prior to encounter.  atorvastatin (LIPITOR) 80 MG tablet, Take 80 mg by mouth daily  amLODIPine (NORVASC) 10 MG tablet, Take 10 mg by mouth daily  aspirin 81 MG EC tablet, Take 81 mg by mouth daily  famotidine (PEPCID) 20 MG tablet, Take 1 tablet (20 mg) by mouth 2 times daily  lisinopril-hydrochlorothiazide (ZESTORETIC) 20-12.5 MG tablet, Take 2 tablets by mouth daily  metFORMIN (GLUCOPHAGE) 500 MG tablet, Take 1,000 mg by mouth 2 times daily (with meals)      Current Facility-Administered Medications   Medication    lactated ringers infusion    lidocaine (LMX4) cream    lidocaine 1 % 0.1-1 mL    ondansetron (ZOFRAN) injection 4 mg    sodium chloride (PF) 0.9% PF flush 3 mL    sodium chloride (PF) 0.9% PF flush 3 mL       PRE-SEDATION ASSESSMENT:    BP (!) 155/85   Pulse 81   Temp 98.8  F (37.1  C) (Tympanic)   Resp 14   Wt 92.5 kg (204 lb)   SpO2 96%   BMI 31.02 kg/m    Lung Exam:   Normal  Heart Exam:  Normal    Comment(s):      IMPRESSION:  Need for screening colonoscopy.    PLAN:  I discussed screening colonoscopy with the patient. Anesthesia coverage requested due to age more than 68.

## 2023-12-01 NOTE — ANESTHESIA POSTPROCEDURE EVALUATION
Patient: Tahir James    Procedure: Procedure(s):  COLONOSCOPY, WITH POLYPECTOMY AND BIOPSY       Anesthesia Type:  MAC    Note:  Disposition: Outpatient   Postop Pain Control: Uneventful            Sign Out: Well controlled pain   PONV: No   Neuro/Psych: Uneventful            Sign Out: Acceptable/Baseline neuro status   Airway/Respiratory: Uneventful            Sign Out: Acceptable/Baseline resp. status   CV/Hemodynamics: Uneventful            Sign Out: Acceptable CV status; No obvious hypovolemia; No obvious fluid overload   Other NRE: NONE   DID A NON-ROUTINE EVENT OCCUR? No           Last vitals:  Vitals Value Taken Time   /62 12/01/23 1145   Temp     Pulse 52 12/01/23 1145   Resp 12 12/01/23 1130   SpO2 97 % 12/01/23 1150   Vitals shown include unfiled device data.    Electronically Signed By: JAKI Collado CRNA  December 1, 2023  11:51 AM

## 2023-12-01 NOTE — ANESTHESIA PREPROCEDURE EVALUATION
Anesthesia Pre-Procedure Evaluation    Patient: Tahir James   MRN: [id number] : 1950        Procedure : Procedure(s):  Colonoscopy          Past Medical History:   Diagnosis Date    Aortic valve sclerosis     Bilateral sensorineural hearing loss     Diabetes mellitus without complication (H)     Essential hypertension     KE (obstructive sleep apnea)     Presbyopia       Past Surgical History:   Procedure Laterality Date    ESOPHAGOSCOPY, GASTROSCOPY, DUODENOSCOPY (EGD), COMBINED N/A 2023    h pylori    RELEASE DUPUYTRENS CONTRACTURE BILATERAL        No Known Allergies   Social History     Tobacco Use    Smoking status: Never    Smokeless tobacco: Never   Substance Use Topics    Alcohol use: Not Currently      Wt Readings from Last 1 Encounters:   23 92.5 kg (204 lb)        Anesthesia Evaluation   Pt has had prior anesthetic.     No history of anesthetic complications       ROS/MED HX  ENT/Pulmonary:     (+) sleep apnea, uses CPAP,                                     Neurologic:  - neg neurologic ROS     Cardiovascular:     (+)  hypertension- -   -  - -                                      METS/Exercise Tolerance: >4 METS    Hematologic:  - neg hematologic  ROS     Musculoskeletal:  - neg musculoskeletal ROS     GI/Hepatic:     (+) GERD, Asymptomatic on medication,                  Renal/Genitourinary:  - neg Renal ROS     Endo:     (+)  type II DM,                    Psychiatric/Substance Use:  - neg psychiatric ROS     Infectious Disease:  - neg infectious disease ROS     Malignancy:  - neg malignancy ROS     Other:  - neg other ROS          Physical Exam    Airway        Mallampati: II   TM distance: > 3 FB   Neck ROM: full   Mouth opening: > 3 cm    Respiratory Devices and Support         Dental       (+) Edentulous      Cardiovascular   cardiovascular exam normal       Rhythm and rate: regular and normal     Pulmonary   pulmonary exam normal        breath sounds clear to auscultation  "          OUTSIDE LABS:  CBC: No results found for: \"WBC\", \"HGB\", \"HCT\", \"PLT\"  BMP:   Lab Results   Component Value Date     (H) 07/05/2023     COAGS: No results found for: \"PTT\", \"INR\", \"FIBR\"  POC: No results found for: \"BGM\", \"HCG\", \"HCGS\"  HEPATIC: No results found for: \"ALBUMIN\", \"PROTTOTAL\", \"ALT\", \"AST\", \"GGT\", \"ALKPHOS\", \"BILITOTAL\", \"BILIDIRECT\", \"XENIA\"  OTHER: No results found for: \"PH\", \"LACT\", \"A1C\", \"WILLIAMS\", \"PHOS\", \"MAG\", \"LIPASE\", \"AMYLASE\", \"TSH\", \"T4\", \"T3\", \"CRP\", \"SED\"    Anesthesia Plan    ASA Status:  2    NPO Status:  NPO Appropriate    Anesthesia Type: MAC.   Induction: Propofol.   Maintenance: Balanced.        Consents    Anesthesia Plan(s) and associated risks, benefits, and realistic alternatives discussed. Questions answered and patient/representative(s) expressed understanding.     - Discussed: Risks, Benefits and Alternatives for BOTH SEDATION and the PROCEDURE were discussed     - Discussed with:  Patient      - Extended Intubation/Ventilatory Support Discussed: No.      - Patient is DNR/DNI Status: No     Use of blood products discussed: No .     Postoperative Care            Comments:               LAURA CHAVEZ, APRN CRNA    I have reviewed the pertinent notes and labs in the chart from the past 30 days and (re)examined the patient.  Any updates or changes from those notes are reflected in this note.             # Drug Induced Platelet Defect: home medication list includes an antiplatelet medication      "

## 2023-12-01 NOTE — OR NURSING
Discharge instructions given to patient and patient's sister. No questions. Ambulated out of unit. Denies pain, nausea or dizziness

## 2023-12-18 ENCOUNTER — TELEPHONE (OUTPATIENT)
Dept: SURGERY | Facility: OTHER | Age: 73
End: 2023-12-18
Payer: COMMERCIAL

## 2023-12-18 NOTE — TELEPHONE ENCOUNTER
[facility] called to regarding results from patient's colonoscopy on 12/01/2023.  They are requesting providers interpretation of the pathology results, and to know if patient is needing another colonoscopy completed.    Their fax # for records is [phone]  Call back # is [phone] - No specific person to request.

## (undated) DEVICE — TUBING SUCTION 10'X3/16" N510

## (undated) DEVICE — ENDO BITE BLOCK 60 MAXI LF 00712804

## (undated) DEVICE — ENDO BRUSH CHANNEL MASTER CLEANING 2-4.2MM BW-412T

## (undated) DEVICE — ENDO TRAP POLYP E-TRAP 00711099

## (undated) DEVICE — HEMOCLIP QUICKCLIP PRO OLYMPUS 230CM HX-202UR.B

## (undated) DEVICE — SOL WATER 1500ML

## (undated) DEVICE — SUCTION MANIFOLD NEPTUNE 2 SYS 4 PORT 0702-020-000

## (undated) DEVICE — ENDO FORCEP ENDOJAW BIOPSY 2.8MMX230CM FB-220U

## (undated) DEVICE — ENDO KIT COMPLIANCE DYKENDOCMPLY

## (undated) DEVICE — ENDO SNARE EXACTO COLD 9MM LOOP 2.4MMX230CM 00711115

## (undated) DEVICE — SYR 50ML LL W/O NDL 309653

## (undated) RX ORDER — PROPOFOL 10 MG/ML
INJECTION, EMULSION INTRAVENOUS
Status: DISPENSED
Start: 2023-12-01